# Patient Record
Sex: FEMALE | Race: WHITE
[De-identification: names, ages, dates, MRNs, and addresses within clinical notes are randomized per-mention and may not be internally consistent; named-entity substitution may affect disease eponyms.]

---

## 2018-02-17 ENCOUNTER — HOSPITAL ENCOUNTER (EMERGENCY)
Dept: HOSPITAL 88 - ER | Age: 78
Discharge: HOME | End: 2018-02-17
Payer: MEDICARE

## 2018-02-17 VITALS — BODY MASS INDEX: 23.05 KG/M2 | WEIGHT: 135 LBS | HEIGHT: 64 IN

## 2018-02-17 DIAGNOSIS — R11.2: Primary | ICD-10-CM

## 2018-02-17 DIAGNOSIS — E87.6: ICD-10-CM

## 2018-02-17 DIAGNOSIS — E78.5: ICD-10-CM

## 2018-02-17 DIAGNOSIS — I10: ICD-10-CM

## 2018-02-17 LAB
ALBUMIN SERPL-MCNC: 4.3 G/DL (ref 3.5–5)
ALBUMIN/GLOB SERPL: 1.2 {RATIO} (ref 0.8–2)
ALP SERPL-CCNC: 117 IU/L (ref 40–150)
ALT SERPL-CCNC: 23 IU/L (ref 0–55)
ANION GAP SERPL CALC-SCNC: 15.7 MMOL/L (ref 8–16)
BACTERIA URNS QL MICRO: (no result) /HPF
BASOPHILS # BLD AUTO: 0 10*3/UL (ref 0–0.1)
BASOPHILS NFR BLD AUTO: 0.2 % (ref 0–1)
BILIRUB UR QL: NEGATIVE
BUN SERPL-MCNC: < 5 MG/DL (ref 7–26)
BUN/CREAT SERPL: 6 (ref 6–25)
CALCIUM SERPL-MCNC: 9.2 MG/DL (ref 8.4–10.2)
CHLORIDE SERPL-SCNC: 91 MMOL/L (ref 98–107)
CK MB SERPL-MCNC: 2.6 NG/ML (ref 0–5)
CK SERPL-CCNC: 124 IU/L (ref 29–168)
CLARITY UR: CLEAR
CO2 SERPL-SCNC: 23 MMOL/L (ref 22–29)
COLOR UR: YELLOW
DEPRECATED NEUTROPHILS # BLD AUTO: 8.9 10*3/UL (ref 2.1–6.9)
DEPRECATED RBC URNS MANUAL-ACNC: (no result) /HPF (ref 0–5)
EGFRCR SERPLBLD CKD-EPI 2021: > 60 ML/MIN (ref 60–?)
EOSINOPHIL # BLD AUTO: 0.1 10*3/UL (ref 0–0.4)
EOSINOPHIL NFR BLD AUTO: 1.1 % (ref 0–6)
EPI CELLS URNS QL MICRO: (no result) /LPF
ERYTHROCYTE [DISTWIDTH] IN CORD BLOOD: 12.5 % (ref 11.7–14.4)
GLOBULIN PLAS-MCNC: 3.6 G/DL (ref 2.3–3.5)
GLUCOSE SERPLBLD-MCNC: 139 MG/DL (ref 74–118)
HCT VFR BLD AUTO: 41.2 % (ref 34.2–44.1)
HGB BLD-MCNC: 15.3 G/DL (ref 12–16)
KETONES UR QL STRIP.AUTO: NEGATIVE
LEUKOCYTE ESTERASE UR QL STRIP.AUTO: (no result)
LIPASE SERPL-CCNC: 20 U/L (ref 8–78)
LYMPHOCYTES # BLD: 2.5 10*3/UL (ref 1–3.2)
LYMPHOCYTES NFR BLD AUTO: 20.5 % (ref 18–39.1)
MCH RBC QN AUTO: 31.4 PG (ref 28–32)
MCHC RBC AUTO-ENTMCNC: 37.1 G/DL (ref 31–35)
MCV RBC AUTO: 84.6 FL (ref 81–99)
MONOCYTES # BLD AUTO: 0.6 10*3/UL (ref 0.2–0.8)
MONOCYTES NFR BLD AUTO: 4.6 % (ref 4.4–11.3)
MUCOUS THREADS URNS QL MICRO: (no result)
NEUTS SEG NFR BLD AUTO: 73.1 % (ref 38.7–80)
NITRITE UR QL STRIP.AUTO: NEGATIVE
PLATELET # BLD AUTO: 217 X10E3/UL (ref 140–360)
POTASSIUM SERPL-SCNC: 2.7 MMOL/L (ref 3.5–5.1)
PROT UR QL STRIP.AUTO: (no result)
RBC # BLD AUTO: 4.87 X10E6/UL (ref 3.6–5.1)
SODIUM SERPL-SCNC: 127 MMOL/L (ref 136–145)
SP GR UR STRIP: 1 (ref 1.01–1.02)
UROBILINOGEN UR STRIP-MCNC: 0.2 MG/DL (ref 0.2–1)
WBC #/AREA URNS HPF: (no result) /HPF (ref 0–5)

## 2018-02-17 PROCEDURE — 84484 ASSAY OF TROPONIN QUANT: CPT

## 2018-02-17 PROCEDURE — 85025 COMPLETE CBC W/AUTO DIFF WBC: CPT

## 2018-02-17 PROCEDURE — 80053 COMPREHEN METABOLIC PANEL: CPT

## 2018-02-17 PROCEDURE — 36415 COLL VENOUS BLD VENIPUNCTURE: CPT

## 2018-02-17 PROCEDURE — 82553 CREATINE MB FRACTION: CPT

## 2018-02-17 PROCEDURE — 87086 URINE CULTURE/COLONY COUNT: CPT

## 2018-02-17 PROCEDURE — 93005 ELECTROCARDIOGRAM TRACING: CPT

## 2018-02-17 PROCEDURE — 83690 ASSAY OF LIPASE: CPT

## 2018-02-17 PROCEDURE — 82550 ASSAY OF CK (CPK): CPT

## 2018-02-17 PROCEDURE — 99284 EMERGENCY DEPT VISIT MOD MDM: CPT

## 2018-02-17 PROCEDURE — 81001 URINALYSIS AUTO W/SCOPE: CPT

## 2018-05-29 ENCOUNTER — HOSPITAL ENCOUNTER (INPATIENT)
Dept: HOSPITAL 88 - ER | Age: 78
LOS: 2 days | Discharge: HOME | DRG: 683 | End: 2018-05-31
Attending: INTERNAL MEDICINE | Admitting: INTERNAL MEDICINE
Payer: MEDICARE

## 2018-05-29 VITALS — HEIGHT: 64 IN | BODY MASS INDEX: 21.95 KG/M2 | WEIGHT: 128.56 LBS

## 2018-05-29 DIAGNOSIS — G62.9: ICD-10-CM

## 2018-05-29 DIAGNOSIS — N39.0: ICD-10-CM

## 2018-05-29 DIAGNOSIS — F41.9: ICD-10-CM

## 2018-05-29 DIAGNOSIS — E78.5: ICD-10-CM

## 2018-05-29 DIAGNOSIS — E87.1: ICD-10-CM

## 2018-05-29 DIAGNOSIS — R63.0: ICD-10-CM

## 2018-05-29 DIAGNOSIS — E86.0: ICD-10-CM

## 2018-05-29 DIAGNOSIS — I10: ICD-10-CM

## 2018-05-29 DIAGNOSIS — R55: ICD-10-CM

## 2018-05-29 DIAGNOSIS — G25.81: ICD-10-CM

## 2018-05-29 DIAGNOSIS — K21.9: ICD-10-CM

## 2018-05-29 DIAGNOSIS — K58.9: ICD-10-CM

## 2018-05-29 DIAGNOSIS — N17.9: Primary | ICD-10-CM

## 2018-05-29 LAB
ALBUMIN SERPL-MCNC: 4.2 G/DL (ref 3.5–5)
ALBUMIN/GLOB SERPL: 1.2 {RATIO} (ref 0.8–2)
ALP SERPL-CCNC: 97 IU/L (ref 40–150)
ALT SERPL-CCNC: 18 IU/L (ref 0–55)
ANION GAP SERPL CALC-SCNC: 16.4 MMOL/L (ref 8–16)
BACTERIA URNS QL MICRO: (no result) /HPF
BASOPHILS # BLD AUTO: 0 10*3/UL (ref 0–0.1)
BASOPHILS NFR BLD AUTO: 0.2 % (ref 0–1)
BILIRUB UR QL: (no result)
BUN SERPL-MCNC: 10 MG/DL (ref 7–26)
BUN/CREAT SERPL: 7 (ref 6–25)
CALCIUM SERPL-MCNC: 9.8 MG/DL (ref 8.4–10.2)
CHLORIDE SERPL-SCNC: 91 MMOL/L (ref 98–107)
CK MB SERPL-MCNC: 1.8 NG/ML (ref 0–5)
CK SERPL-CCNC: 61 IU/L (ref 29–168)
CLARITY UR: (no result)
CO2 SERPL-SCNC: 23 MMOL/L (ref 22–29)
COLOR UR: YELLOW
DEPRECATED NEUTROPHILS # BLD AUTO: 11.8 10*3/UL (ref 2.1–6.9)
DEPRECATED RBC URNS MANUAL-ACNC: (no result) /HPF (ref 0–5)
EGFRCR SERPLBLD CKD-EPI 2021: 37 ML/MIN (ref 60–?)
EOSINOPHIL # BLD AUTO: 0 10*3/UL (ref 0–0.4)
EOSINOPHIL NFR BLD AUTO: 0.3 % (ref 0–6)
EPI CELLS URNS QL MICRO: (no result) /LPF
ERYTHROCYTE [DISTWIDTH] IN CORD BLOOD: 12.5 % (ref 11.7–14.4)
GLOBULIN PLAS-MCNC: 3.5 G/DL (ref 2.3–3.5)
GLUCOSE SERPLBLD-MCNC: 126 MG/DL (ref 74–118)
HCT VFR BLD AUTO: 39.6 % (ref 34.2–44.1)
HGB BLD-MCNC: 14.4 G/DL (ref 12–16)
KETONES UR QL STRIP.AUTO: (no result)
LEUKOCYTE ESTERASE UR QL STRIP.AUTO: (no result)
LYMPHOCYTES # BLD: 2.4 10*3/UL (ref 1–3.2)
LYMPHOCYTES NFR BLD AUTO: 15.9 % (ref 18–39.1)
MCH RBC QN AUTO: 32.4 PG (ref 28–32)
MCHC RBC AUTO-ENTMCNC: 36.4 G/DL (ref 31–35)
MCV RBC AUTO: 89 FL (ref 81–99)
MONOCYTES # BLD AUTO: 0.8 10*3/UL (ref 0.2–0.8)
MONOCYTES NFR BLD AUTO: 5 % (ref 4.4–11.3)
MUCOUS THREADS URNS QL MICRO: (no result)
NEUTS SEG NFR BLD AUTO: 77.9 % (ref 38.7–80)
NITRITE UR QL STRIP.AUTO: NEGATIVE
PLATELET # BLD AUTO: 300 X10E3/UL (ref 140–360)
POTASSIUM SERPL-SCNC: 3.4 MMOL/L (ref 3.5–5.1)
PROT UR QL STRIP.AUTO: (no result)
RBC # BLD AUTO: 4.45 X10E6/UL (ref 3.6–5.1)
SODIUM SERPL-SCNC: 127 MMOL/L (ref 136–145)
SP GR UR STRIP: 1.03 (ref 1.01–1.02)
UROBILINOGEN UR STRIP-MCNC: 8 MG/DL (ref 0.2–1)
WBC #/AREA URNS HPF: (no result) /HPF (ref 0–5)

## 2018-05-29 PROCEDURE — 84439 ASSAY OF FREE THYROXINE: CPT

## 2018-05-29 PROCEDURE — 82553 CREATINE MB FRACTION: CPT

## 2018-05-29 PROCEDURE — 83735 ASSAY OF MAGNESIUM: CPT

## 2018-05-29 PROCEDURE — 87086 URINE CULTURE/COLONY COUNT: CPT

## 2018-05-29 PROCEDURE — 70450 CT HEAD/BRAIN W/O DYE: CPT

## 2018-05-29 PROCEDURE — 82533 TOTAL CORTISOL: CPT

## 2018-05-29 PROCEDURE — 80053 COMPREHEN METABOLIC PANEL: CPT

## 2018-05-29 PROCEDURE — 82550 ASSAY OF CK (CPK): CPT

## 2018-05-29 PROCEDURE — 81001 URINALYSIS AUTO W/SCOPE: CPT

## 2018-05-29 PROCEDURE — 84484 ASSAY OF TROPONIN QUANT: CPT

## 2018-05-29 PROCEDURE — 93005 ELECTROCARDIOGRAM TRACING: CPT

## 2018-05-29 PROCEDURE — 84300 ASSAY OF URINE SODIUM: CPT

## 2018-05-29 PROCEDURE — 99284 EMERGENCY DEPT VISIT MOD MDM: CPT

## 2018-05-29 PROCEDURE — 84443 ASSAY THYROID STIM HORMONE: CPT

## 2018-05-29 PROCEDURE — 83036 HEMOGLOBIN GLYCOSYLATED A1C: CPT

## 2018-05-29 PROCEDURE — 83880 ASSAY OF NATRIURETIC PEPTIDE: CPT

## 2018-05-29 PROCEDURE — 83930 ASSAY OF BLOOD OSMOLALITY: CPT

## 2018-05-29 PROCEDURE — 83935 ASSAY OF URINE OSMOLALITY: CPT

## 2018-05-29 PROCEDURE — 93306 TTE W/DOPPLER COMPLETE: CPT

## 2018-05-29 PROCEDURE — 36415 COLL VENOUS BLD VENIPUNCTURE: CPT

## 2018-05-29 PROCEDURE — 82436 ASSAY OF URINE CHLORIDE: CPT

## 2018-05-29 PROCEDURE — 93880 EXTRACRANIAL BILAT STUDY: CPT

## 2018-05-29 PROCEDURE — 80048 BASIC METABOLIC PNL TOTAL CA: CPT

## 2018-05-29 PROCEDURE — 84133 ASSAY OF URINE POTASSIUM: CPT

## 2018-05-29 PROCEDURE — 80061 LIPID PANEL: CPT

## 2018-05-29 PROCEDURE — 71045 X-RAY EXAM CHEST 1 VIEW: CPT

## 2018-05-29 PROCEDURE — 85025 COMPLETE CBC W/AUTO DIFF WBC: CPT

## 2018-05-29 RX ADMIN — SODIUM CHLORIDE SCH GM: 9 INJECTION, SOLUTION INTRAVENOUS at 23:30

## 2018-05-29 NOTE — DIAGNOSTIC IMAGING REPORT
CHEST SINGLE (PORTABLE), 5/29/2018 8:37 PM



Technique: CHEST SINGLE (PORTABLE)

Comparison: None

Clinical history: Syncope



Findings:

Normal cardiac silhouette. Lower parathoracic density which may reflect a

hiatal hernia. Large lung volumes without consolidation or effusion.



Impression:

1. No acute abnormality.

2. Probable hiatal hernia. 

3. Calcific densities projecting over the lower hemithorax



Recommend follow-up upright PA and lateral when feasible.



Signed by: Dr Maryana Lehman MD on 5/29/2018 10:19 PM

## 2018-05-29 NOTE — DIAGNOSTIC IMAGING REPORT
EXAMINATION: Head CT 



HISTORY: Syncope, possible head trauma

COMPARISON: None.

TECHNIQUE: Multidetector axial images were obtained without contrast from the

foramen magnum to the vertex . The images were reconstructed using brain and

bone algorithms.  Thin section brain images were reformatted into coronal and

sagittal planes.

Intravenous contrast: None. 

Motion/streaking artifact limits the evaluation of the skull base and posterior

cranial fossa.



FINDINGS:



     Parenchyma: 

1.  Few scattered white matter hypodensities, most likely nonspecific chronic

microvascular ischemic changes.

2.  No mass or hemorrhage. No CT evidence of acute territorial vascular insult.



    

     Extra-axial spaces:No abnormal density. No extra-axial fluid collections 

     Brain volume: Normal for age. 

     Ventricles: No hydrocephalus or displacement.  

     Arteries: No density suggestive of thrombus. 

     Dural sinuses: No abnormal density. 

     Extra-axial spaces: No abnormal density. 

     Foramen magnum: No mass, Chiari malformation, or basilar invagination. 

     Sella: No obvious mass.  

     Paranasal/mastoid sinuses: Imaged portions unremarkable. 

     Skull/Scalp: No lytic or blastic lesions.  No fractures. 



IMPRESSION:



1.  No acute post traumatic intracranial hemorrhage.

2.  Mild chronic microvascular ischemic changes.



Signed by: Dr. Jovita Tran M.D. on 5/29/2018 9:54 PM

## 2018-05-30 VITALS — DIASTOLIC BLOOD PRESSURE: 46 MMHG | SYSTOLIC BLOOD PRESSURE: 96 MMHG

## 2018-05-30 VITALS — DIASTOLIC BLOOD PRESSURE: 60 MMHG | SYSTOLIC BLOOD PRESSURE: 113 MMHG

## 2018-05-30 VITALS — SYSTOLIC BLOOD PRESSURE: 121 MMHG | DIASTOLIC BLOOD PRESSURE: 81 MMHG

## 2018-05-30 VITALS — DIASTOLIC BLOOD PRESSURE: 62 MMHG | SYSTOLIC BLOOD PRESSURE: 142 MMHG

## 2018-05-30 VITALS — DIASTOLIC BLOOD PRESSURE: 56 MMHG | SYSTOLIC BLOOD PRESSURE: 103 MMHG

## 2018-05-30 VITALS — DIASTOLIC BLOOD PRESSURE: 64 MMHG | SYSTOLIC BLOOD PRESSURE: 134 MMHG

## 2018-05-30 VITALS — DIASTOLIC BLOOD PRESSURE: 81 MMHG | SYSTOLIC BLOOD PRESSURE: 121 MMHG

## 2018-05-30 LAB
ALBUMIN SERPL-MCNC: 3.2 G/DL (ref 3.5–5)
ALBUMIN/GLOB SERPL: 1.2 {RATIO} (ref 0.8–2)
ALP SERPL-CCNC: 75 IU/L (ref 40–150)
ALT SERPL-CCNC: 12 IU/L (ref 0–55)
ANION GAP SERPL CALC-SCNC: 11.5 MMOL/L (ref 8–16)
BASOPHILS # BLD AUTO: 0 10*3/UL (ref 0–0.1)
BASOPHILS NFR BLD AUTO: 0.3 % (ref 0–1)
BUN SERPL-MCNC: 10 MG/DL (ref 7–26)
BUN/CREAT SERPL: 11 (ref 6–25)
CALCIUM SERPL-MCNC: 8.5 MG/DL (ref 8.4–10.2)
CHLORIDE SERPL-SCNC: 94 MMOL/L (ref 98–107)
CK MB SERPL-MCNC: 1.1 NG/ML (ref 0–5)
CK MB SERPL-MCNC: 1.6 NG/ML (ref 0–5)
CK SERPL-CCNC: 40 IU/L (ref 29–168)
CK SERPL-CCNC: 46 IU/L (ref 29–168)
CO2 SERPL-SCNC: 23 MMOL/L (ref 22–29)
DEPRECATED NEUTROPHILS # BLD AUTO: 5 10*3/UL (ref 2.1–6.9)
EGFRCR SERPLBLD CKD-EPI 2021: > 60 ML/MIN (ref 60–?)
EOSINOPHIL # BLD AUTO: 0.1 10*3/UL (ref 0–0.4)
EOSINOPHIL NFR BLD AUTO: 1 % (ref 0–6)
ERYTHROCYTE [DISTWIDTH] IN CORD BLOOD: 12.6 % (ref 11.7–14.4)
GLOBULIN PLAS-MCNC: 2.6 G/DL (ref 2.3–3.5)
GLUCOSE SERPLBLD-MCNC: 116 MG/DL (ref 74–118)
HCT VFR BLD AUTO: 31.8 % (ref 34.2–44.1)
HGB BLD-MCNC: 11.4 G/DL (ref 12–16)
LYMPHOCYTES # BLD: 2.1 10*3/UL (ref 1–3.2)
LYMPHOCYTES NFR BLD AUTO: 26.8 % (ref 18–39.1)
MCH RBC QN AUTO: 32.5 PG (ref 28–32)
MCHC RBC AUTO-ENTMCNC: 35.8 G/DL (ref 31–35)
MCV RBC AUTO: 90.6 FL (ref 81–99)
MONOCYTES # BLD AUTO: 0.7 10*3/UL (ref 0.2–0.8)
MONOCYTES NFR BLD AUTO: 8.4 % (ref 4.4–11.3)
NEUTS SEG NFR BLD AUTO: 63 % (ref 38.7–80)
PLATELET # BLD AUTO: 217 X10E3/UL (ref 140–360)
POTASSIUM SERPL-SCNC: 3.5 MMOL/L (ref 3.5–5.1)
POTASSIUM UR-SCNC: 16 MMOL/L
RBC # BLD AUTO: 3.51 X10E6/UL (ref 3.6–5.1)
SODIUM SERPL-SCNC: 125 MMOL/L (ref 136–145)
SODIUM UR-SCNC: 57 MMOL/L

## 2018-05-30 RX ADMIN — PANTOPRAZOLE SODIUM SCH MG: 40 TABLET, DELAYED RELEASE ORAL at 09:24

## 2018-05-30 RX ADMIN — SODIUM CHLORIDE SCH GM: 9 INJECTION, SOLUTION INTRAVENOUS at 21:30

## 2018-05-30 RX ADMIN — BENAZEPRIL HYDROCHLORIDE SCH MG: 10 TABLET, COATED ORAL at 09:00

## 2018-05-30 RX ADMIN — METOCLOPRAMIDE SCH MG: 5 INJECTION, SOLUTION INTRAMUSCULAR; INTRAVENOUS at 21:30

## 2018-05-30 RX ADMIN — METOCLOPRAMIDE SCH MG: 5 INJECTION, SOLUTION INTRAMUSCULAR; INTRAVENOUS at 11:03

## 2018-05-30 RX ADMIN — SODIUM CHLORIDE TAB 1 GM SCH GM: 1 TAB at 17:43

## 2018-05-30 RX ADMIN — METOCLOPRAMIDE SCH MG: 5 INJECTION, SOLUTION INTRAMUSCULAR; INTRAVENOUS at 17:43

## 2018-05-30 RX ADMIN — SODIUM CHLORIDE SCH MLS/HR: 9 INJECTION, SOLUTION INTRAVENOUS at 09:30

## 2018-05-30 RX ADMIN — SODIUM CHLORIDE SCH MLS/HR: 9 INJECTION, SOLUTION INTRAVENOUS at 15:55

## 2018-05-30 NOTE — CONSULTATION
DATE OF CONSULTATION:  May 30, 2018 



NEPHROLOGY CONSULTATION 



REASON FOR CONSULTATION:  Hyponatremia.



HPI:  This is a 78-year-old  female with past medical history of 

hypertension, hiatal hernia, GERD, hyperlipidemia, and neuropathy, who 

comes into the ED after having a syncopal episode at home.  The patient 

reports that she was at a local grocery store, became very nauseated, 

passed out and hit her head.  She remembers when the event occurred.  The 

patient was seen and evaluated at bedside on the medical floor and further 

interviewed.  She reports taking hydrochlorothiazide for underlying blood 

pressure control.  She denies any lower extremity edema.  Denies any 

over-the-counter medications including herbal supplements.  She denies any 

antidepressant medications except for lorazepam for anxiety.  Denies any 

vomiting or decreased oral intake.  Does not have a history of diabetes as 

well.  There are no reports of any history of heart failure or liver 

failure.  The patient reports that several years ago she was told to have 

low sodium, but since then no one has ever told her that her sodium was 

low, and she does report seeing her primary care physician pretty 

frequently and has labs occasionally during her visits.  The patient was 

seen and evaluated at bedside, currently doing well with no other 

complaints.



REVIEW OF SYSTEMS:  Pertinent positives:  Nausea, syncopal episode.  

Pertinent negatives: Denies any chest pain, palpitations, vomiting, 

dysuria, hematuria, frequency, urgency, lightheadedness, dizziness, 

abdominal pain, headache, shortness of breath, or any other complaints.  

The rest of the 14-point review of systems have been reviewed with the 

patient and are negative.



ALLERGIES:  NO KNOWN DRUG ALLERGIES.



HOME MEDICATIONS

1. Benazepril 20 mg daily.

2. Dicyclomine 20 mg p.o. b.i.d. 

3. Pepcid 40 mg daily.

4. Hydrochlorothiazide 25 mg daily. 

5. Lorazepam 0.5 mg p.o. t.i.d. p.r.n. for anxiety.

6. Protonix.

7. Pramipexole 0.5 mg at bedtime.

8. Simvastatin 40 mg daily.



PAST MEDICAL HISTORY:  Hypertension, restless legs syndrome, 

hyperlipidemia.



SURGICAL HISTORY:  She reports none.



FAMILY HISTORY:  Hypertension, diabetes.



SOCIAL HISTORY:  She lives with family.  Good social support.  No drugs, no 

alcohol.  Does not smoke.  



VITAL SIGNS:  Temperature is 98.8.  Pulse is 78.  Respiratory rate is 19.  

Blood pressure is 96/46.  Her pulse ox is 100% on room air. 



LABS: White count 7.8, hemoglobin 11.4, hematocrit 31, platelets 217.  

Chemistries:  Sodium on admission was 127, now 125.  Potassium 3.5, 

chloride 94, bicarb 23, anion gap 11, BUN 10, creatinine 0.89, glucose 116, 

calcium 8.5, AST 15, ALT 12, alk phos 75.  Troponins are negative. Albumin 

is 3.2.  UA is consistent with UTI.



MICROBIOLOGY:  Urine culture is pending. 



IMAGING STUDIES:  Chest x-ray:  No acute abnormality.  There is evidence of 

a hiatal hernia.  CT brain:  No acute post-traumatic intracranial 

hemorrhage.  Mild chronic microvascular ischemic changes.  



PHYSICAL EXAMINATION 

GENERAL:  Not in acute distress.  Alert, oriented times 3, cooperative on 

exam.  

HEENT:  Head is normocephalic, atraumatic.  Eyes:  Pupils are equal, round 

and reactive to light bilaterally.  Extraocular movements intact 

bilaterally.  

NECK:  Supple with good range of motion.  

THROAT:  No evidence of any erythema or exudates in the posterior pharynx.  

Has poor dentition.

PULMONARY:  Clear to auscultation bilaterally.  No wheezing, no rales, no 

rhonchi, no crackles appreciated.

CARDIOVASCULAR:  Positive S1, S2.  No murmurs, rubs or gallops appreciated.

ABDOMEN:  Soft, nondistended, nontender to palpation.  Bowel sounds 

present.

MUSCULOSKELETAL:  Strength is 5/5 throughout.  No evidence of any 

musculoskeletal deficit on examination.  No weakness appreciated.

NEUROLOGICAL:  Cranial nerves II through XII are grossly intact.  No 

evidence of any neurological deficits on exam.

SKIN:  Intact.  Warm to touch.  Good capillary refill.

PSYCHIATRIC:  Normal affect and mood.  

EXTREMITIES:  No edema.  Good range of motion throughout.



IMPRESSION

1. Euvolemic, hypotonic, hyponatremic.

2. Acute kidney injury secondary to dehydration. 

3. Syncope, likely secondary to dehydration.

4. Urinary tract infection.



PLAN:  At this time, we will get urine electrolytes, urine chloride, urine 

sodium, urine osmol, serum osmol, TSH level, baseline cortisol in the 

morning.  Put on salt tabs 2 g p.o. b.i.d. times 2 doses.  Volume 

restriction of 1.2 liters total in a day.  Hold hydrochlorothiazide.  The 

patient will likely be discharged on no hydrochlorothiazide, and a 

different antihypertensive medication is recommended.  Will put on NS at 50 

mL per hour.  Repeat sodium in the morning.  Will continue to monitor very 

closely.  I am unsure of the patient's baseline sodium, but it seems like 

she has had this in the past.  I am thinking this is likely due to 

hydrochlorothiazide as she reports she has been taking this drug for 

several years now.  She is not on any antidepressant medications to 

indicate SIADH at this time.  Chest x-ray was normal.  We will get these 

labs and determine the final course of action.



Thank you so much for this consultation.  We will continue to follow with 

you.



 



DD:  05/30/2018 10:37

DT:  05/30/2018 11:46

Job#:  [id number]

## 2018-05-31 VITALS — DIASTOLIC BLOOD PRESSURE: 76 MMHG | SYSTOLIC BLOOD PRESSURE: 141 MMHG

## 2018-05-31 VITALS — DIASTOLIC BLOOD PRESSURE: 58 MMHG | SYSTOLIC BLOOD PRESSURE: 128 MMHG

## 2018-05-31 VITALS — SYSTOLIC BLOOD PRESSURE: 141 MMHG | DIASTOLIC BLOOD PRESSURE: 76 MMHG

## 2018-05-31 VITALS — DIASTOLIC BLOOD PRESSURE: 58 MMHG | SYSTOLIC BLOOD PRESSURE: 122 MMHG

## 2018-05-31 LAB
ANION GAP SERPL CALC-SCNC: 10.7 MMOL/L (ref 8–16)
BASOPHILS # BLD AUTO: 0 10*3/UL (ref 0–0.1)
BASOPHILS NFR BLD AUTO: 0.4 % (ref 0–1)
BNP BLD-MCNC: 99.7 PG/ML (ref 0–100)
BUN SERPL-MCNC: 5 MG/DL (ref 7–26)
BUN/CREAT SERPL: 7 (ref 6–25)
CALCIUM SERPL-MCNC: 9.1 MG/DL (ref 8.4–10.2)
CHLORIDE SERPL-SCNC: 103 MMOL/L (ref 98–107)
CHOLEST SERPL-MCNC: 156 MD/DL (ref 0–199)
CHOLEST/HDLC SERPL: 4 {RATIO} (ref 3–3.6)
CO2 SERPL-SCNC: 24 MMOL/L (ref 22–29)
DEPRECATED NEUTROPHILS # BLD AUTO: 4.7 10*3/UL (ref 2.1–6.9)
EGFRCR SERPLBLD CKD-EPI 2021: > 60 ML/MIN (ref 60–?)
EOSINOPHIL # BLD AUTO: 0.1 10*3/UL (ref 0–0.4)
EOSINOPHIL NFR BLD AUTO: 1.1 % (ref 0–6)
ERYTHROCYTE [DISTWIDTH] IN CORD BLOOD: 12.8 % (ref 11.7–14.4)
GLUCOSE SERPLBLD-MCNC: 107 MG/DL (ref 74–118)
HCT VFR BLD AUTO: 34.7 % (ref 34.2–44.1)
HDLC SERPL-MSCNC: 39 MG/DL (ref 40–60)
HGB BLD-MCNC: 12.2 G/DL (ref 12–16)
LDLC SERPL CALC-MCNC: 105 MG/DL (ref 60–130)
LYMPHOCYTES # BLD: 2.1 10*3/UL (ref 1–3.2)
LYMPHOCYTES NFR BLD AUTO: 28.4 % (ref 18–39.1)
MAGNESIUM SERPL-MCNC: 1.8 MG/DL (ref 1.3–2.1)
MCH RBC QN AUTO: 32.2 PG (ref 28–32)
MCHC RBC AUTO-ENTMCNC: 35.2 G/DL (ref 31–35)
MCV RBC AUTO: 91.6 FL (ref 81–99)
MONOCYTES # BLD AUTO: 0.5 10*3/UL (ref 0.2–0.8)
MONOCYTES NFR BLD AUTO: 6.6 % (ref 4.4–11.3)
NEUTS SEG NFR BLD AUTO: 63.2 % (ref 38.7–80)
OSMOLALITY SERPL: 256 MOSMOL/KG (ref 280–301)
PLATELET # BLD AUTO: 234 X10E3/UL (ref 140–360)
POTASSIUM SERPL-SCNC: 3.7 MMOL/L (ref 3.5–5.1)
RBC # BLD AUTO: 3.79 X10E6/UL (ref 3.6–5.1)
SODIUM SERPL-SCNC: 134 MMOL/L (ref 136–145)
T4 FREE SERPL-MCNC: 1.15 NG/DL (ref 0.9–1.8)
TRIGL SERPL-MCNC: 62 MG/DL (ref 0–149)
TSH SERPL DL<=0.005 MIU/L-ACNC: 2.39 UIU/ML (ref 0.35–4.94)

## 2018-05-31 RX ADMIN — SODIUM CHLORIDE TAB 1 GM SCH GM: 1 TAB at 08:41

## 2018-05-31 RX ADMIN — BENAZEPRIL HYDROCHLORIDE SCH MG: 10 TABLET, COATED ORAL at 08:41

## 2018-05-31 RX ADMIN — METOCLOPRAMIDE SCH MG: 5 INJECTION, SOLUTION INTRAMUSCULAR; INTRAVENOUS at 08:29

## 2018-05-31 RX ADMIN — PANTOPRAZOLE SODIUM SCH MG: 40 TABLET, DELAYED RELEASE ORAL at 08:41

## 2018-05-31 NOTE — DISCHARGE SUMMARY
ADMISSION DIAGNOSES:  

1. Syncope. 

2. Urinary tract infection. 

3. Hyponatremia. 

4. Hypertension. 

5. Hyperlipidemia. 

6. Anxiety. 

7. Gastroesophageal reflux disease. 

8. Hiatal hernia. 

9. Nausea with poor appetite. 

10. Irritable bowel syndrome.

11. Restless leg syndrome.



DISCHARGE DIAGNOSES   

1. Syncope. 

2. Urinary tract infection. 

3. Hyponatremia. 

4. Hypertension. 

5. Hyperlipidemia. 

6. Anxiety. 

7. Gastroesophageal reflux disease. 

8. Hiatal hernia. 

9. Nausea with poor appetite. 

10. Irritable bowel syndrome.

11. Restless leg syndrome.

12. Ruled out cerebrovascular accident. 



HISTORY:   The patient has a history of hypertension, hiatal hernia, GERD, 

RLS, hyperlipidemia, neuropathy and IBS.  



HOSPITAL COURSE:   This 78-year-old female presents with frequency and 

dysuria over the last couple of day.  She was also at [facility] yesterday when 

she became nauseous and then passed out.  She did not hit her head.  She 

remembers all events before and after the syncopal episode.  She was passed 

out 1-2 minutes before she woke up to workers helping her.  She had similar 

episode about 10 years ago.  She denies fever and emesis.  



On admission, CT of the brain was done which was negative.  Carotid Doppler 

negative.  Echo showed an EF of 55% to 60% with mild tricuspid 

regurgitation and trace mitral regurgitation.  For the UTI, the patient was 

started on Rocephin empirically.  The urine culture was contaminated so the 

patient was discharged with Ceftin for 5 more days.  On admission, sodium 

was 127.  The patient started on IV fluids and given salt tabs.  Her 

hydrochlorothiazide home dose was discontinued.  Nephrology was also 

following and recommended the same.  The patient continued Benazepril for 

her hypertension and blood pressure remained stable without the 

hydrochlorothiazide.  She resumed home meds for anxiety, hyperlipidemia, 

GERD, irritable bowel syndrome and restless leg syndrome.  She was started 

on Reglan as she says that the nausea always starts before she gets dizzy.  

The patient is discharged home with daughter with Reglan and Ceftin for 5 

more days.  She will follow up with primary care physician in 1-2 weeks. 

She was instructed to stop her hydrochlorothiazide.  







Dictated by:  Therese Graf NP 

 



 _________________________________

PANTERA LEE MD



DD:  05/31/2018 10:53

DT:  05/31/2018 15:16

Job#:  [id number]

## 2023-11-02 NOTE — XMS REPORT
Ambulatory Summary

 Created on: 2018



Fouzia Carvajal

External Reference #: [id number]

: 1940

Sex: Female



Demographics







 Address  [address] [place]  [address]

 

 Home Phone  [phone]

 

 Preferred Language  English

 

 Marital Status  

 

 Adventism Affiliation  Unknown

 

 Race  White

 

 Ethnic Group  Non-





Author







 Organization  Unknown

 

 Address  [address] [place] [address]  [address]



 

 Phone  [phone]







Care Team Providers







 Care Team Member Name  Role  Phone

 

 EDIN "BUDDY" SCAR AVERY  3  [phone]

 

 EDOUARD CARRILLO MD  111  [phone]



                                                      



Allergies

          





 Code  Code System  Name  Reaction  Severity  Status  Onset









 NKDA        



                                                                              



Medications

                      





 Name                    Status                    Start Date                   
 Stop Date                                    









 albuterol sulfate 2.5 mg/3 mL (0.083 %) solution for nebulization  Active    
Not available

 

 alendronate 70 mg tablet  Completed    2017

 

 Aspirin Low Dose 81 mg tablet,delayed release

 Take 1 tablet every day by oral route for 90 days.  Active    Not available

 

 azithromycin 250 mg tablet  Completed    2018

 

 benazepril 20 mg tablet

 Take 1 tablet every day by oral route for 90 days.  Active    Not available

 

 ceftriaxone 1 gram solution for injection

 Take 1 g by injection route.  Completed    2018

 

 cephalexin 500 mg capsule  Completed    2017

 

 Cheratussin AC 10 mg-100 mg/5 mL oral liquid

 Take 10 mL every 4 hours by oral route.  Completed  2018

 

 Citracal + D Slow Release 600 mg calcium-500 unit tablet,ext.release

 Take 1 tablet every day by oral route for 90 days.  Active    Not available

 

 Depo-Medrol 80 mg/mL suspension for injection

 Take 1 mL by injection route.  Completed    2018

 

 diazepam 2 mg tablet

 Take 1 tablet 3 times a day by oral route for 30 days.  Completed    2018

 

 diazepam 5 mg tablet  Completed    2017

 

 dicyclomine 20 mg tablet  Active    Not available

 

 famotidine 40 mg tablet

 Take 1 tablet every day by oral route at bedtime for 90 days.  Active    Not 
available

 

 hydrochlorothiazide 25 mg tablet

 Take 1 tablet every day by oral route for 90 days.  Active    Not available

 

 lactulose 10 gram/15 mL (15 mL) oral solution

 Take 30 mL twice a day by oral route for 90 days.  Completed    2017

 

 lactulose 10 gram/15 mL oral solution  Completed    2017

 

 latanoprost 0.005 % eye drops  Active    Not available

 

 levalbuterol HFA 45 mcg/actuation aerosol inhaler  Completed    2018

 

 levocetirizine 5 mg tablet

 Take 1 tablet every day by oral route as needed for 90 days.  Completed    2018

 

 Linzess 72 mcg capsule

 Take 1 capsule every day by oral route for 16 days.  Completed    10/05/2017

 

 lorazepam 0.5 mg tablet

 Take 1 tablet 3 times a day by oral route as needed for 30 days.  Active    
Not available

 

 meloxicam 15 mg tablet

 Take 1 tablet every day by oral route for 90 days.  Completed    2018

 

 methocarbamol 750 mg tablet

 Take 1 tablet every day by oral route for 90 days.  Completed    2017

 

 ondansetron HCl 4 mg tablet

 Take 1 tablet every day by oral route.  Completed  2018

 

 pantoprazole 40 mg tablet,delayed release

 Take 1 tablet every day by oral route in the morning for 90 days.  Active    
Not available

 

 paroxetine 10 mg tablet  Completed    2018

 

 promethazine 12.5 mg tablet

 take 1 or 2 tablets every 6 hours as needed for nausea  Active    Not 
available

 

 raloxifene 60 mg tablet  Completed    2017

 

 simvastatin 40 mg tablet

 Take 1 tablet every day by oral route in the evening for 90 days.  Active    
Not available

 

 Tylenol Extra Strength 500 mg tablet

 Take 2 tablets every 8 hours by oral route as needed for 90 days.  Completed  
  2017

 

 Ventolin HFA 90 mcg/actuation aerosol inhaler

 INHALE TWO PUFFS BY MOUTH EVERY 6 HOURS AS NEEDED  Completed    2018

 

 Zylet 0.3 %-0.5 % eye drops,suspension  Completed    2017



                                                                               
                                                                               
                                                                               
                                                                               
                                                                               
             



Problems

                      





 Name                    Status                    Onset Date                   
 Source                                    









 Hypercholesterolemia  Active  2017  

 

 Anxiety  Active  2017  

 

 Hypertensive Disorder  Active  2017  

 

 Gastroesophageal Reflux Disease  Active  2017  

 

 Hiatal Hernia  Active  2017  

 

 Irritable Bowel Syndrome Characterized by Constipation  Active  2017  

 

 Chronic Sciatica  Active  2017  

 

 Osteoporosis  Active  2017  

 

 Nausea  Active  2017  

 

 Age Related Macular Degeneration  Active  2017  

 

 Open-angle Glaucoma - Borderline  Active  2017  

 

 Atherosclerosis of Aorta  Active  2018  



                                                                               
                                                                               
                                        



Procedures

                      





 Date                    Name                    Performed by                   
                 









 2011  Eye Surgery  Information not available

 

 2004  Cholecystectomy  Information not available

 

 2002  Hernia Repair  Information not available

 

 1984  Hysterectomy (Partial)  Information not available

 

 2017  MAMMO, Screening, Digital, Bilateral  [facility]

 [address] [place] [address]

 [phone] (Work Place)

 

 2017  Bone Density  [facility]

 [address] [place], [place] [address] [phone] ([employer])

 

 2017  Electrocardiogram  [facility]

 [address] [facility] [address]

[place] [id number] [phone] ([employer])

 

 2017  XR, Chest, 2 View  [facility]

 [address] [place], [place] [address] [phone] ([employer])

 

 2018  MAMMO, Screening, Digital, Bilateral  [facility]

 [address] [place], [place] [address] [phone] ([employer])

 

 2018  Electrocardiogram  [facility]

 [address] [facility] [address]

[place] [address] [phone] ([employer] Place)



                                                                               
                                                                               
                    



Lab Results

                      





 Date                    Name                    Specimen                    
Result                    Interpretation                    Description        
            Value                    Range                    Status           
         Address                                    









 2018  BMP, Serum or Plasma     Low      Bun   7.0 mg/dL  9.8-20.1 mg/dL  
Final  [facility]: [address]

 

              Glucose   87 mg/dL  70-99 mg/dL  Final  [facility]: [address]

 

              Creatinine   0.74 mg/dL  0.57-1.11 mg/dL  Final  [facility]: [address]

 

              eGFR  Non-african American   >60 mL/min/1.73m2  >60 mL/min/
1.73m2  Final  [facility]: [address] [place] [address], 
[place]

 

              eGFR - African American   >60 mL/min/1.73m2  >60 mL/min/1.73m2
  Final  [facility]: [address] [place] [address], [place]

 

        Low      Sodium   134 mEq/L  136-145 mEq/L  Final  [facility]: [address]

 

              Potassium   4.2 mEq/L  3.5-5.1 mEq/L  Final  [facility]: [address]

 

              Chloride   98 mmol/L   mmol/L  Final  [facility]: [address] [place]

 

              Calcium   9.2 mg/dL  8.4-10.2 mg/dL  Final  [facility]: [address] [place]

 

              Co2   27.7 mmol/L  23.0-31.0 mmol/L  Final  [facility]: [address] [place]

 

              Anion Gap   8 calc    Final  [facility] 
Laboratory: [address] [place]

 

 2018  Tech Slide Review     High      White Blood Cell Count   11.5 
thousand/uL  3.8-10.8 thousand/uL  Final  [facility]: 
[address] [place]

 

        Normal      Red Blood Cell Count   5.02 million/uL  3.80-5.10 million/
uL  Final  [facility]: [address] [place]

 

        Normal      Hemoglobin   15.5 g/dL  11.7-15.5 g/dL  Final  [facility]: [address] [place]

 

        Normal      Hematocrit   44.9 %  35.0-45.0 %  Final  [facility]: [address] [place]

 

        Normal      Mcv   89.4 fL  80.0-100.0 fL  Final  [facility]: [address] [place]

 

        Normal      Mch   30.9 pg  27.0-33.0 pg  Final  [facility]: [address] [place]

 

        Normal      Mchc   34.5 g/dL  32.0-36.0 g/dL  Final  [facility]: [address] [place]

 

        Normal      Rdw   12.7 %  11.0-15.0 %  Final  [facility] 
Laboratory: [address] [place]

 

        Normal      Platelet Count   246 thousand/uL  140-400 thousand/uL  
Final  [facility]: [address] [place]

 

        Normal      Mpv   10.1 fL  7.5-12.5 fL  Final  [facility]: [address] [place]

 

 2018  Differential, Manual, Blood     High      Absolute Neutrophils   
8131 cells/uL  6186-4123 cells/uL  Final  [facility]: 
[address] [place]

 

        Normal      Absolute Lymphocytes   2553 cells/uL  850-3900 cells/uL  
Final  [facility]: [address] [place]

 

        Normal      Absolute Monocytes   472 cells/uL  200-950 cells/uL  
Final  [facility]: [address] [place]

 

        Normal      Absolute Eosinophils   345 cells/uL   cells/uL  
Final  [facility]: [address] [place]

 

        Normal      Absolute Basophils   0 cells/uL  0-200 cells/uL  Final  
[facility]: [address] [place]

 

        Normal      Neutrophils   70.7 %    Final  [facility] 
Laboratory: [address] [place]

 

        Normal      Lymphocytes   22.2 %    Final  [facility] 
Laboratory: [address] [place]

 

        Normal      Monocytes   4.1 %    Final  [facility] 
Laboratory: [address] [place]

 

        Normal      Eosinophils   3.0 %    Final  [facility] 
Laboratory: [address] [place]

 

        Normal      Basophils   0 %    Final  [facility] 
Laboratory: [address] [place]

 

 2018  CMP, Serum or Plasma           Alt   31 U/L  0-55 U/L  Final  
[facility]: [address]

 

              Ast   27 U/L  5-34 U/L  Final  [facility] 
Laboratory: [address] [place] [address]

 

              Bun   10.0 mg/dL  9.8-20.1 mg/dL  Final  [facility]: [address] [place] [address]

 

              Alk Phos   112 unit/L   unit/L  Final  [facility] [address] [facility]

 

        High      Glucose   108 mg/dL  70-99 mg/dL  Final  [facility]: [address] [place] [address]

 

              Albumin   4.4 g/dL  3.5-5.0 g/dL  Final  [facility] [address] [facility]

 

              Creatinine   0.84 mg/dL  0.57-1.11 mg/dL  Final  [facility] [address] [facility]

 

              eGFR  Non-african American   >60 mL/min/1.73m2  >60 mL/min/
1.73m2  Final  [facility]: [address]

 

              Total Bilirubin   1.1 mg/dL  0.2-1.2 mg/dL  Final  [facility] [address] [facility]

 

              eGFR - African American   >60 mL/min/1.73m2  >60 mL/min/1.73m2
  Final  [facility] [address] [facility]

 

        Low      Sodium   135 mEq/L  136-145 mEq/L  Final  [facility]: [address]

 

              Potassium   4.7 mEq/L  3.5-5.1 mEq/L  Final  [facility]: [address]

 

        Low      Chloride   96 mmol/L   mmol/L  Final  [facility]: [address]

 

              Total Protein   7.6 g/dL  6.4-8.3 g/dL  Final  [facility]: [address]

 

              Calcium   10.0 mg/dL  8.4-10.2 mg/dL  Final  [facility]: [address]

 

              Co2   29.0 mmol/L  23.0-31.0 mmol/L  Final  [facility]: [address]

 

              Anion Gap   10 calc    Final  [facility] [address] [facility]

 

 2018  Lipid Panel, Serum           Hdl   40 mg/dL  40-60 mg/dL  Final  
[facility]: [address]

 

              Triglyceride   97 mg/dL  0-149 mg/dL  Final  [facility]: [address]

 

              VLDL Calc.   19 mg/dL    Final  [facility] 
Laboratory: [address]

 

              cholesterol/HDL Ratio   4.7 mg/dL    Final  [facility]: [address]

 

              non-HDL Cholesterol Calc.   149 mg/dL  0-160 mg/dL  Final  
[facility]: [address]

 

              Cholesterol   189 mg/dL  0-199 mg/dL  Final  [facility]: [address]

 

              LDL Calc.   130 mg/dL  0-130 mg/dL  Final  [facility]: [address]

 

 2018  T4, Total, Serum           T4 Total   7.46 ug/dL  4.87-11.72 ug/
dL  Final  [facility]: [address] [place] [address], [place]

 

 2018  TSH, Serum or Plasma           Tsh   2.311 uIU/mL  0.350-4.940 uIU
/mL  Final  [facility]: [address] [address], [place]

 

 10/05/2017  Culture, Urine     ABNORMAL      Culture, Urine, Routine   see 
note    Final  [facility]: [address] [address], 
[place]

 

 2017  Lipid Panel, Serum     High      Cholesterol, Total   206 mg/dL  
125-200 mg/dL  Final  [facility]: [address], [place]

 

        Normal      HDL Cholesterol   49 mg/dL  > or=46 mg/dL  Final  [facility]: [address], [place]

 

        Normal      Triglycerides   106 mg/dL  <150 mg/dL  Final  [facility]: [address], [place]

 

        High      LDL-cholesterol   136 mg/dL (calc)  <130 mg/dL (calc)  
Final  [facility]: [address], [place]

 

        Normal      Chol/hdlc Ratio   4.2 (calc)  < or=5.0 (calc)  Final  
[facility]: [address], [place]

 

        Normal      Non HDL Cholesterol   157 mg/dL (calc)    Final  [facility]: [address], [place]

 

 2017  CMP, Serum or Plasma     High      Glucose   115 mg/dL  65-99 mg/
dL  Final  [facility]: [address], [place]

 

        Normal      Urea Nitrogen (BUN)   11 mg/dL  7-25 mg/dL  Final  [facility]: [address], [place]

 

        Normal      Creatinine   0.90 mg/dL  0.60-0.93 mg/dL  Final  [facility]: [address], [place]

 

        Normal      eGFR Non-afr. American   62 mL/min/1.73m2  > or=60 mL/min/
1.73m2  Final  [facility]: [address], [place]

 

        Normal      eGFR African American   72 mL/min/1.73m2  > or=60 mL/min/
1.73m2  Final  [facility]: [address], [place]

 

              BUN/creatinine Ratio   not applicable (calc)  6-22 (calc)  
Final  [facility]: [address] [facility], [place]

 

        Normal      Sodium   139 mmol/L  135-146 mmol/L  Final  [facility]: [address] [facility], [place]

 

        Normal      Potassium   4.0 mmol/L  3.5-5.3 mmol/L  [facility]: [address] [facility], [place]

 

        Normal      Chloride   101 mmol/L   mmol/L  [facility]: [address] [facility], [place]

 

        Normal      Carbon Dioxide   27 mmol/L  20-31 mmol/L  [facility]: [address] [facility], [place]

 

        Normal      Calcium   9.8 mg/dL  8.6-10.4 mg/dL  [facility]: [address] [facility], [place]

 

        Normal      Protein, Total   7.5 g/dL  6.1-8.1 g/dL  Final  [facility]: [address] [place] [address], [place]

 

        Normal      Albumin   4.6 g/dL  3.6-5.1 g/dL  Final  [facility]: [address] [place] [address], [place]

 

        Normal      Globulin   2.9 g/dL (calc)  1.9-3.7 g/dL (calc)  [facility]: [address] [place] [address], [place]

 

        Normal      Albumin/globulin Ratio   1.6 (calc)  1.0-2.5 (calc)  
[facility]: [address] [place] [address], [place]

 

        Normal      Bilirubin, Total   0.6 mg/dL  0.2-1.2 mg/dL  Final  [facility]: [address] [place] [address], [place]

 

        Normal      Alkaline Phosphatase   100 U/L   U/L  [facility]: [address] [place] [address], [place]

 

        Normal      Ast   22 U/L  10-35 U/L  [facility]: [address] [facility], [place]

 

        Normal      Alt   18 U/L  6-29 U/L  [facility] 
Lab: [address] [facility], [place]

 

 2017  CBC W/ Auto Diff     Normal      White Blood Cell Count   10.4 
thousand/uL  3.8-10.8 thousand/uL  Final  [facility]: [address] 
[facility], [place]

 

        Normal      Red Blood Cell Count   4.68 million/uL  3.80-5.10 million/
uL  Final  [facility]: [address], [place]

 

        Normal      Hemoglobin   14.4 g/dL  11.7-15.5 g/dL  Final  [facility]: [address], [place]

 

        Normal      Hematocrit   43.8 %  35.0-45.0 %  Final  [facility]: [address], [place]

 

        Normal      Mcv   93.7 fL  80.0-100.0 fL  Final  [facility]: [address], [place]

 

        Normal      Mch   30.7 pg  27.0-33.0 pg  Final  [facility]: [address], [place]

 

        Normal      Mchc   32.8 g/dL  32.0-36.0 g/dL  Final  [facility]: [address], [place]

 

        Normal      Rdw   13.5 %  11.0-15.0 %  Final  [facility]: [address], [place]

 

        Normal      Platelet Count   222 thousand/uL  140-400 thousand/uL  
Final  [facility]: [address], [place]

 

        Normal      Mpv   9.3 fL  7.5-11.5 fL  Final  [facility]: [address], [place]

 

        Normal      Absolute Neutrophils   6448 cells/uL  6544-7145 cells/uL  
Final  [facility]: [address], [place]

 

        Normal      Absolute Lymphocytes   3318 cells/uL  850-3900 cells/uL  
Final  [facility]: [address], [place]

 

        Normal      Absolute Monocytes   458 cells/uL  200-950 cells/uL  
Final  [facility]: [address], [place]

 

        Normal      Absolute Eosinophils   146 cells/uL   cells/uL  
Final  [facility] [facility]: [address], [place]

 

        Normal      Absolute Basophils   31 cells/uL  0-200 cells/uL  Final  
[facility]: [address], [place]

 

        Normal      Neutrophils   62.0 %    Final  [facility]: [address], [place]

 

        Normal      Lymphocytes   31.9 %    Final  [facility] [employer] [facility]: [address], [place]

 

        Normal      Monocytes   4.4 %    Final  [facility] 
Lab: [address], [place]

 

        Normal      Eosinophils   1.4 %    Final  [facility] 
Lab: [address] [facility], [place]

 

        Normal      Basophils   0.3 %    Final  [facility] 
Lab: [address] [facility], [place]

 

 2017  T4, Total, Serum           T4, Total (Thyroxine)   8.6 mcg/dL  4.8
-10.4 mcg/dL  Final  [facility]: [address] [facility], [place]

 

 2017  TSH, Serum or Plasma     Normal      Tsh   2.91 mIU/L  0.40-4.50 
mIU/L  Final  [facility]: [address] [facility], [place]

 

   Electrocardiogram          No observation recorded.        [facility]: [address] [facility] [address], [place]

 

   Urinalysis, Dipstick           Color Color   dark yellow      [facility]: [address] [facility] [address], [place]

 

              Color Appearance   cloudy      [facility]: [address] [facility] [address], [place]

 

              Color Glucose   negative      [facility]: [address] [facility] [address], [place]

 

              Color Bilirubin   negative      [facility]: [address] [facility] [address], [place]

 

              Color Ketones   small      [facility]: [address] [facility] [address], [place]

 

              Color Specific Gravity   1.010      [facility]: [address] 
[facility] [address], [place]

 

              Color Blood   small      [facility]: [address] [facility] [address], [place]

 

              Color PH   6.0      [facility]: [address] [facility] 
[address], [place]

 

              Color Protein   negative      [facility]: [address] [facility] [address], [place]

 

              Color Urobilinogen   1      [facility] [facility] [address] [facility] [address], [place]

 

              Color Nitrites   positive      [facility]: [address] [facility] [address], [place]

 

              Color Leukocytes   moderate      [facility]: [address] 
[facility] [address], [place]

 

   Electrocardiogram           Rate & Rhythm         [facility]: 
[address] [facility] [address], [place]

 

              Qrs         [facility]: [address] [facility] [address], 
[place]

 

              NC Interval         [facility]: [address] [facility] [address], [place]

 

              QRS Duration         [facility]: [address] [facility] [address], [place]

 

              QT Interval         [facility]: [address] [facility] [address], [place]



                                                                               
                                                                               
                                                                                



Past Encounters

                      





 2018

Anorexia Symptom; Hypertensive Disorder; Anxiety; Nausea; Open-angle Glaucoma - 
Borderline

Edin Valadez MD: [address] [facility], [facility] [address], [place] [address], Ph. 
[phone]

 

 2018

Nausea; Hypokalemia

Milena Live FNP: [address] [facility], [address] [address], Ph. [phone]
[phone]

 

 2018

Adult Health Examination; Body Mass Index 20-24 - Normal; Hypertensive Disorder
; Gastroesophageal Reflux Disease; Chronic Sciatica; Hypercholesterolemia; 
Atherosclerosis of Aorta; Anxiety; Irritable Bowel Syndrome Characterized by 
Constipation; Age Related Macular Degeneration; Open-angle Glaucoma - Borderline
; Nausea; Osteoporosis; Depression Screening; Advance Directive Discussed with 
Patient; Screening for Malignant Neoplasm of Breast; Screening for Malignant 
Neoplasm of Colon; Screening for Osteoporosis

Edin Valadez MD: [address] [facility], [address] [address], Ph. 
[phone]

 

 2018

Asthmatic Bronchitis

David Ashley MD: [address] [place] [address], Ph. [phone] 
[phone]

 

 2018

Anxiety; Gastroesophageal Reflux Disease; Chronic Sciatica; Screening 
Mammography; Atherosclerosis of Aorta; Hypertensive Disorder; Irritable Bowel 
Syndrome Characterized by Constipation; Age Related Macular Degeneration; Open-
angle Glaucoma - Borderline; Hypercholesterolemia; Osteoporosis; Hiatal Hernia

Edin Valadez MD: [address] [facility], [address] [address], Ph. 
[phone]

 

 2017

Anxiety; Hypertensive Disorder; Gastroesophageal Reflux Disease; 
Atherosclerosis of Aorta

Edin Valadez MD: [address] [facility], [address] [address], Ph. 
[phone]

 

 10/05/2017

Acute Urinary Tract Infection; Influenza Vaccination; Body Mass Index 25-29 - 
Overweight

Allison Luo MD: [address] [facility], [address] [address], Ph. [phone]
[phone]

 

 2017

Anxiety; Gastroesophageal Reflux Disease; Irritable Bowel Syndrome 
Characterized by Constipation; Body Mass Index 25-29 - Overweight; Glaucoma

Edin Valadez MD: [address] [facility], [address] [address], Ph. 
[phone]

 

 2017

Hypertensive Disorder; Impacted Cerumen in Right Ear; Cough; Acute Bronchitis

Milena Mancini ELTONP: [address] [facility], [address], [place] [address], Ph. [phone]
[phone]

 

 2017

Anxiety; Gastroesophageal Reflux Disease; Hypertensive Disorder; Chronic 
Sciatica; Hypercholesterolemia; Irritable Bowel Syndrome; Chronic Constipation

Edin Valadez MD: [address] [facility], [facility] [address], [place] [address], Ph. 
[phone]

 

 2017

Hypercholesterolemia; Osteoporosis; Chronic Sciatica; Hypertensive Disorder; 
Gastroesophageal Reflux Disease

Edin Valadez MD: [address] [facility], [facility] [address], [place] [address], Ph. 
[phone]

 

 2017

Adult Health Examination; Body Mass Index 20-24 - Normal; Hypertensive Disorder
; Hypercholesterolemia; Primary Open Angle Glaucoma; Osteoporosis; 
Gastroesophageal Reflux Disease; Anxiety; Allergic Rhinitis; Screening for 
Malignant Neoplasm of Breast; Chronic Sciatica; Depression Screening; Nausea; 
Hiatal Hernia

DAE Conley: [address] [facility], [facility] [address], [place] [address], 
Ph. [phone]



                                                                               
                                                                               
                                        



Social History

          





 Smoking Status  Never Smoker   



                                                                              



Vaccine List

          





 Vaccine Type

 

 influenza, high dose seasonal

 

 10/05/83421.5 mL

 

 pneumococcal, unspecified formulation

 

 2010









 Notes: DECLINE'S ALL VACCINE'S-2018



                                                                               
                   



Plan of Care

          





Patient Instructions





 Increase PO fluids. Complete all antibiotics as prescribed. Call or RTC for 
worsening of symptoms or no improvement in 2-3 days.











 Reminders        Provider

 

 Appointments  None recorded.    

 

 Lab  None recorded.    

 

 Referral  None recorded.    

 

 Procedures  None recorded.    

 

 Surgeries  None recorded.    

 

 Imaging  None recorded.    



                                                                              



Vitals

          2018 10:45AM Est Patient







 Height    Weight    BMI    Blood Pressure 

 

  5 ft 3 in      132 lbs      23.4 kg/m2      (1) 185/101 mm[Hg]

 (2) 183/95 mm[Hg]  



2018 02:45PM Est Patient







 Height    Weight    BMI    Blood Pressure 

 

  5 ft 3 in      137.5 lbs      24.4 kg/m2      (1) 163/90 mm[Hg]

 (2) 155/88 mm[Hg]  



2018 01:45PM AWV







 Height    Weight    BMI    Blood Pressure 

 

  5 ft 3 in      137.2 lbs      24.3 kg/m2      124/74 mm[Hg]  



2018 02:45PM Est Patient







 Height    Weight    Blood Pressure 

 

  5 ft 3 in            132/78 mm[Hg]  



2018 04:15PM Est Patient







 Height    Weight    BMI    Blood Pressure 

 

  5 ft 3 in      144 lbs      25.5 kg/m2      175/90 mm[Hg]  



2017 09:15AM Est Patient







 Height    Weight    BMI    Blood Pressure 

 

  5 ft 3 in      141 lbs      25 kg/m2      156/81 mm[Hg]  



10/05/2017 02:45PM Work In Same Day







 Height    Weight    BMI    Blood Pressure 

 

  5 ft 3 in      142 lbs      25.2 kg/m2      145/82 mm[Hg]  



2017 09:15AM Est Patient







 Height    Weight    BMI    Blood Pressure 

 

  5 ft 3 in      145.6 lbs      25.8 kg/m2      (1) 176/94 mm[Hg]

 (2) 168/84 mm[Hg]  



2017 10:00AM Est Patient







 Height    Weight    BMI    Blood Pressure 

 

  5 ft 3 in      149 lbs      26.4 kg/m2      134/87 mm[Hg]  



2017 04:15PM Est Patient







 Height    Weight    BMI    Blood Pressure 

 

  5 ft 3 in      144 lbs      25.5 kg/m2      191/105 mm[Hg]  



2017 04:30PM Est Patient







 Height    Weight    BMI    Blood Pressure 

 

  5 ft 3 in      137.2 lbs      24.3 kg/m2      162/85 mm[Hg]  



2017 10:30AM NP/EST CPX







 Height    Weight    BMI    Blood Pressure 

 

  5 ft 3 in      139 lbs      24.6 kg/m2      171/90 mm[Hg]
How Severe Is Your Rash?: mild
Patient Summary Document

 Created on: 2018



VELASQUEZ DUKE

External Reference #: [id number]

: 1940

Sex: Female



Demographics







 Address  [address] [place] [address]

 

 Home Phone  [phone]

 

 Preferred Language  Unknown

 

 Marital Status  Unknown

 

 Mandaeism Affiliation  Unknown

 

 Race  Unknown

 

 Additional Race(s)  

 

 Ethnic Group  Unknown





Author







 [facility]  [facility]

 

 [facility]

 

 Address  Unknown

 

 Phone  Unavailable







Care Team Providers







 Care Team Member Name  Role  Phone

 

 EVIN CATALAN  Unavailable  Unavailable







Problems

This patient has no known problems.



Allergies, Adverse Reactions, Alerts

This patient has no known allergies or adverse reactions.



Medications

This patient has no known medications.



Results







 Test Description  Test Time  Test Comments  Text Results  Atomic Results  
Result Comments









 CT BRAIN WO            [place] [address]      Patient Name: VELASQUEZ DUKE   MR #: [id number]    : 1940 Age/Sex: 78/F  Acct #: [id number] Req 
#: [id number]  Adm Physician:     Ordered by: EVIN CATALAN MD  Report #
: [id number]   Location: ER  Room/Bed:     ______________________________________
_____________________________________________________________    Procedure: [id number]
-0026 CT/CT BRAIN WO  Exam Date: 18                            Exam Time: 
       REPORT STATUS: Signed    EXAMINATION: Head CT       HISTORY: Syncope
, possible head trauma   COMPARISON: None.   TECHNIQUE: Multidetector axial 
images were obtained without contrast from the   foramen magnum to the vertex . 
The images were reconstructed using brain and   bone algorithms.  Thin section 
brain images were reformatted into coronal and   sagittal planes.   Intravenous 
contrast: None.    Motion/streaking artifact limits the evaluation of the skull 
base and posterior   cranial fossa.      FINDINGS:           Parenchyma:    1.  
Few scattered white matter hypodensities, most likely nonspecific chronic   
microvascular ischemic changes.   2.  No mass or hemorrhage. No CT evidence of 
acute territorial vascular insult.                  Extra-axial spaces:No 
abnormal density. No extra-axial fluid collections         Brain volume: Normal 
for age.         Ventricles: No hydrocephalus or displacement.          Arteries
: No density suggestive of thrombus.         Dural sinuses: No abnormal 
density.         Extra-axial spaces: No abnormal density.         Foramen magnum
: No mass, Chiari malformation, or basilar invagination.         Sella: No 
obvious mass.          Paranasal/mastoid sinuses: Imaged portions unremarkable.
         Skull/Scalp: No lytic or blastic lesions.  No fractures.       
IMPRESSION:      1.  No acute post traumatic intracranial hemorrhage.   2.  
Mild chronic microvascular ischemic changes.      Signed by: Dr. Naseem Tran M.D. on 2018 9:54 PM        Dictated By: NASEEM TRAN MD  Electronically 
Signed By: NASEEM TRAN MD on 18  Transcribed By: KELBY on 18       COPY TO:   EVIN CATALAN MD           

 

 CHEST SINGLE (PORTABLE)            [place] [address]      Patient Name: VELASQUEZ DUKE   MR #: [id number]    : 1940 Age/Sex: 78/F  Acct #: 
[id number] Req #: [id number]  Adm Physician:     Ordered by: EVIN CATALAN MD  Report #: [id number]   Location: ER  Room/Bed:     ________________
________________________________________________________________________________
___    Procedure: [id number] DX/CHEST SINGLE (PORTABLE)  Exam Date: 18    
                        Exam Time:        REPORT STATUS: Signed    CHEST 
SINGLE (PORTABLE), 2018 8:37 PM      Technique: CHEST SINGLE (PORTABLE)   
Comparison: None   Clinical history: Syncope      Findings:   Normal cardiac 
silhouette. Lower parathoracic density which may reflect a   hiatal hernia. 
Large lung volumes without consolidation or effusion.      Impression:   1. No 
acute abnormality.   2. Probable hiatal hernia.    3. Calcific densities 
projecting over the lower hemithorax      Recommend follow-up upright PA and 
lateral when feasible.      Signed by: Dr Lionel Lehman MD on 2018 10:19 
PM        Dictated By: LIONEL LEHMAN MD  Electronically Signed By: LIONEL LEHMAN MD on 18  Transcribed By: KELBY on 18       
COPY TO:   EVIN CATALAN MD
Is This A New Presentation, Or A Follow-Up?: Rash